# Patient Record
(demographics unavailable — no encounter records)

---

## 2024-10-17 NOTE — DISCUSSION/SUMMARY
[EKG obtained to assist in diagnosis and management of assessed problem(s)] : EKG obtained to assist in diagnosis and management of assessed problem(s) [FreeTextEntry1] : 83-year-old M with PMHx of CAD (2007 CABG x 4), Atrial flutter/fib (on Eliquis), HLD, HTN, chronic HFrEF, presents today for follow up.  1. HTN:  - improved (pt was dehydrated and hypotensive)  - continue with Entresto 97/103 and Carvedilol 25 mg oral, Jardiance 10mg daily  - repeat BMP and BNP today   2. CAD/HLD: No signs/sx of acute ischemia, LDL at goal in Feb at 30 mg/dL  - c/w Lipitor 80 mg oral daily  - c/w Zetia 10 mg oral daily   3. HFrEF: Mild pitting edema on today's exam TTE Aug 2023 w/ EF at 35% - continue with beta blocker, entresto and Jardiance as above - advised to continue to follow with heart failure team - checking labs as above  - will order repeat echo; performed today after his visit as an add on   4. Aflutter/fib: Asymptomatic, rate controlled but currently in AFib. No signs of GI/ bleed. - continue with Eliquis 5 mg BID daily  - patient requested to change EP to [place] as pt is currently being seen at [place]. Gave referral in person of possible EP providers (Dr. Phipps or Dr. Castillo)   Gave referrals for new PCP. Advised dermatology appt for nipple soreness.   One month f/u w/ NP for BP check. (Make sure pt has appt w/ EP provider at this visit).  Follow up with Dr Cazares in 6 months in person.

## 2024-10-17 NOTE — HISTORY OF PRESENT ILLNESS
[FreeTextEntry1] : 83-year-old M with PMHx of CAD (2007 CABG x 4), Atrial flutter/fib (on Eliquis), HLD, HTN, chronic HFrEF, presents today for follow up.   Since his last visit; pt presented to the ER for hypotension/dehydration. He reports increasing his water intake and that has helped, as well as w/ leg cramps. The only med that was being held was Jardiance. Will recheck labs today and likely restart it.   Patient denies any chest pain, SOB, palpitations, orthopnea, PND. He feels the LE edema has been well controlled. Wearing compression socks regularly.   Pt reports some L nipple soreness.   ================================= Prior history   Pt says that he is feeling much better since staring Jardiance 10 mg Qd, Aug 2023. He has been able to walk up to his 4th floor walk up apartment without any FORBES; no HF exacerbations/hospitalizations since the last time he visited. Medically compliant with all of his medications.   No chest pain, SOB, palpitations, nausea, vomiting, PND, or orthopnea reported.  =============================== Last visits:  Patient went to the [facility] Ed ([place]) since last visit due to dizziness on turning. In the ED he received a CT scan which showed no abnormalities. He was discharged on Meclizine which he thinks helped. Patient has not reported any falls. He was previously given a referral to follow Heart Failure team previously, but he never followed with them.  GDMT:  lipitor 80 mg Qd, carvedilol 25 mg BID, eliquis 5 mg Qd, entresto  BID, jardiance 10 mg Qd, spironolactone 25 mg Qd  ================================= Labs/Diagnostics: 2024 lipids  HDL 66 LDL 30 TG 89, a1c 5.7% Lipid profile (10/2023): TAG 85, , HDL 63, LDL 36/ BMP Creatinine 1.41, eGFR 50  A1C (10/2023): 5.8  Lipid profile (2022): T, TC: 157, HDL: 68, LDL: 73/ K/Creat 4.7/1.06/ A1c: 5.7%  - Echo (2023): mildly dilated LV cavity sz, left ventricular wall thickness is normal, left ventricular systolic fnx is moderately decreased, multiple segmental abnormalities exist, there is nl LV diastolic fnx w. nl filling pressures, nl rv cavity sz, nl RV systolic fnx, mild to moderate MR, mild TR, mild to moderate AR, no pericardial effusion  - Echo (2022): moderate segmental LV systolic dysfunction; the inferior, basal inferoseptal and inferolateral walls are akinetic; the anterolateral wall is hypokinetic; LVEF 35%; normal RV size and function; mild AR - Echo (2021)- EF 35%, inf, inferolateral, basal inferoseptal, akinesis, mid & apical anterolateral hypo, mild LVH, nl RV size and fx, no effusion

## 2024-10-17 NOTE — CARDIOLOGY SUMMARY
[de-identified] : ECG 02/2024: similar to prior ECG - occassional PVCs with anterior infarct; atrial fib

## 2024-10-17 NOTE — PHYSICAL EXAM
[Well Developed] : well developed [Well Nourished] : well nourished [No Acute Distress] : no acute distress [Normal Conjunctiva] : normal conjunctiva [Normal Venous Pressure] : normal venous pressure [No Carotid Bruit] : no carotid bruit [Clear Lung Fields] : clear lung fields [Good Air Entry] : good air entry [No Respiratory Distress] : no respiratory distress  [Normal Gait] : normal gait [No Varicosities] : no varicosities [No Rash] : no rash [Moves all extremities] : moves all extremities [No Focal Deficits] : no focal deficits [Normal Speech] : normal speech [Alert and Oriented] : alert and oriented [Normal memory] : normal memory [Normal] : soft, non-tender, no masses/organomegaly, normal bowel sounds [No Cyanosis] : no cyanosis [No Clubbing] : no clubbing [Normal PT B/L] : normal PT B/L [Normal DP B/L] : normal DP B/L [de-identified] : irregularly irregular rhythm  [de-identified] : 1+ pitting edema to R LE; 2+ pitting edema to L LE

## 2024-11-15 NOTE — DISCUSSION/SUMMARY
[FreeTextEntry1] : 83-year-old M with PMHx of CAD (2007 CABG x 4), Atrial flutter/fib (on Eliquis), HLD, HTN, chronic HFrEF, presents today for follow up.  1. HTN: reasonable today, but advised he continue to stay hydrated and check BP at home; alert us of any hypotension or dizziness as BP a bit soft today  - continue with Entresto 97/103 and Carvedilol 25 mg oral, Jardiance 10mg daily  - repeat BMP revealed K normalized to 5 and renal fx is stable   2. CAD/HLD: No signs/sx of acute ischemia, LDL at goal in Feb at 30 mg/dL  - c/w Lipitor 80 mg and Zetia 10 mg oral daily   3. HFrEF: euvolemic on exam  TTE 10/21/24:  Left ventricular cavity is mildly dilated. Left ventricular wall thickness is normal. Left ventricular systolic function is moderately decreased with an ejection fraction visually estimated at 30 to 35 %. There are no regional wall motion abnormalities seen. Mildly reduced right ventricular systolic function. Left atrium is mildly dilated. Moderate mitral regurgitation. Mild to moderate tricuspid regurgitation. Mild to moderate aortic regurgitation. Moderate non-mobile focal atheroma in the visualized portions of the transverse aortic arch. - continue with beta blocker, entresto and Jardiance as above - advised to continue to follow with heart failure team  4. Aflutter/fib: Asymptomatic, rate controlled - continue with Eliquis 5 mg BID daily  - patient requested to change EP to [place] as pt is currently being seen at [place]. Gave referral in person of possible EP providers (Dr. Phipps or Dr. Castillo and reached out to  to call pt)   Case discussed w/ Dr. Cazares.  Follow up with Dr Cazares in 6 months in person.

## 2024-11-15 NOTE — CARDIOLOGY SUMMARY
[de-identified] : ECG 02/2024: similar to prior ECG - occassional PVCs with anterior infarct; atrial fib

## 2024-11-15 NOTE — HISTORY OF PRESENT ILLNESS
[FreeTextEntry1] : 83-year-old M with PMHx of CAD (2007 CABG x 4), Atrial flutter/fib (on Eliquis), HLD, HTN, chronic HFrEF, presents today for follow up.   Since his last visit; pt reports feeling well. He has maintained his water intake and that has helped, as well as w/ leg cramps.  Patient denies any chest pain, SOB, palpitations, orthopnea, PND. He feels the LE edema has been well controlled. Wearing compression socks regularly.   Pt reports compliance w/ all his meds.   TTE 10/21/24:  Left ventricular cavity is mildly dilated. Left ventricular wall thickness is normal. Left ventricular systolic function is moderately decreased with an ejection fraction visually estimated at 30 to 35 %. There are no regional wall motion abnormalities seen. Mildly reduced right ventricular systolic function. Left atrium is mildly dilated. Moderate mitral regurgitation. Mild to moderate tricuspid regurgitation. Mild to moderate aortic regurgitation. Moderate non-mobile focal atheroma in the visualized portions of the transverse aortic arch.  ================================= Prior history   Pt says that he is feeling much better since staring Jardiance 10 mg Qd, Aug 2023. He has been able to walk up to his 4th floor walk up apartment without any FORBES; no HF exacerbations/hospitalizations since the last time he visited. Medically compliant with all of his medications.   No chest pain, SOB, palpitations, nausea, vomiting, PND, or orthopnea reported.  =============================== Last visits:  Patient went to the [facility] Ed ([facility]) since last visit due to dizziness on turning. In the ED he received a CT scan which showed no abnormalities. He was discharged on Meclizine which he thinks helped. Patient has not reported any falls. He was previously given a referral to follow Heart Failure team previously, but he never followed with them.  GDMT:  lipitor 80 mg Qd, carvedilol 25 mg BID, eliquis 5 mg Qd, entresto  BID, jardiance 10 mg Qd, spironolactone 25 mg Qd  ================================= Labs/Diagnostics: 2024 lipids  HDL 66 LDL 30 TG 89, a1c 5.7% Lipid profile (10/2023): TAG 85, , HDL 63, LDL 36/ BMP Creatinine 1.41, eGFR 50  A1C (10/2023): 5.8  Lipid profile (2022): T, TC: 157, HDL: 68, LDL: 73/ K/Creat 4.7/1.06/ A1c: 5.7%  - Echo (2023): mildly dilated LV cavity sz, left ventricular wall thickness is normal, left ventricular systolic fnx is moderately decreased, multiple segmental abnormalities exist, there is nl LV diastolic fnx w. nl filling pressures, nl rv cavity sz, nl RV systolic fnx, mild to moderate MR, mild TR, mild to moderate AR, no pericardial effusion  - Echo (2022): moderate segmental LV systolic dysfunction; the inferior, basal inferoseptal and inferolateral walls are akinetic; the anterolateral wall is hypokinetic; LVEF 35%; normal RV size and function; mild AR - Echo (2021)- EF 35%, inf, inferolateral, basal inferoseptal, akinesis, mid & apical anterolateral hypo, mild LVH, nl RV size and fx, no effusion

## 2024-11-15 NOTE — DISCUSSION/SUMMARY
[FreeTextEntry1] : 83-year-old M with PMHx of CAD (2007 CABG x 4), Atrial flutter/fib (on Eliquis), HLD, HTN, chronic HFrEF, presents today for follow up.  1. HTN: reasonable today, but advised he continue to stay hydrated and check BP at home; alert us of any hypotension or dizziness as BP a bit soft today  - continue with Entresto 97/103 and Carvedilol 25 mg oral, Jardiance 10mg daily  - repeat BMP revealed K normalized to 5 and renal fx is stable   2. CAD/HLD: No signs/sx of acute ischemia, LDL at goal in Feb at 30 mg/dL  - c/w Lipitor 80 mg and Zetia 10 mg oral daily   3. HFrEF: euvolemic on exam  TTE 10/21/24:  Left ventricular cavity is mildly dilated. Left ventricular wall thickness is normal. Left ventricular systolic function is moderately decreased with an ejection fraction visually estimated at 30 to 35 %. There are no regional wall motion abnormalities seen. Mildly reduced right ventricular systolic function. Left atrium is mildly dilated. Moderate mitral regurgitation. Mild to moderate tricuspid regurgitation. Mild to moderate aortic regurgitation. Moderate non-mobile focal atheroma in the visualized portions of the transverse aortic arch. - continue with beta blocker, entresto and Jardiance as above - advised to continue to follow with heart failure team  4. Aflutter/fib: Asymptomatic, rate controlled - continue with Eliquis 5 mg BID daily  - patient requested to change EP to [place] as pt is currently being seen at [place]. Gave referral in person of possible EP providers (Dr. Phipps or Dr. Castillo and reached out to  to call pt)   Case discussed w/ Dr. Cazares.  Follow up with Dr Cazares in 6 months in person.

## 2024-11-15 NOTE — HISTORY OF PRESENT ILLNESS
[FreeTextEntry1] : 83-year-old M with PMHx of CAD (2007 CABG x 4), Atrial flutter/fib (on Eliquis), HLD, HTN, chronic HFrEF, presents today for follow up.   Since his last visit; pt reports feeling well. He has maintained his water intake and that has helped, as well as w/ leg cramps.  Patient denies any chest pain, SOB, palpitations, orthopnea, PND. He feels the LE edema has been well controlled. Wearing compression socks regularly.   Pt reports compliance w/ all his meds.   TTE 10/21/24:  Left ventricular cavity is mildly dilated. Left ventricular wall thickness is normal. Left ventricular systolic function is moderately decreased with an ejection fraction visually estimated at 30 to 35 %. There are no regional wall motion abnormalities seen. Mildly reduced right ventricular systolic function. Left atrium is mildly dilated. Moderate mitral regurgitation. Mild to moderate tricuspid regurgitation. Mild to moderate aortic regurgitation. Moderate non-mobile focal atheroma in the visualized portions of the transverse aortic arch.  ================================= Prior history   Pt says that he is feeling much better since staring Jardiance 10 mg Qd, Aug 2023. He has been able to walk up to his 4th floor walk up apartment without any FORBES; no HF exacerbations/hospitalizations since the last time he visited. Medically compliant with all of his medications.   No chest pain, SOB, palpitations, nausea, vomiting, PND, or orthopnea reported.  =============================== Last visits:  Patient went to the [facility] Ed ([facility]) since last visit due to dizziness on turning. In the ED he received a CT scan which showed no abnormalities. He was discharged on Meclizine which he thinks helped. Patient has not reported any falls. He was previously given a referral to follow Heart Failure team previously, but he never followed with them.  GDMT:  lipitor 80 mg Qd, carvedilol 25 mg BID, eliquis 5 mg Qd, entresto  BID, jardiance 10 mg Qd, spironolactone 25 mg Qd  ================================= Labs/Diagnostics: 2024 lipids  HDL 66 LDL 30 TG 89, a1c 5.7% Lipid profile (10/2023): TAG 85, , HDL 63, LDL 36/ BMP Creatinine 1.41, eGFR 50  A1C (10/2023): 5.8  Lipid profile (2022): T, TC: 157, HDL: 68, LDL: 73/ K/Creat 4.7/1.06/ A1c: 5.7%  - Echo (2023): mildly dilated LV cavity sz, left ventricular wall thickness is normal, left ventricular systolic fnx is moderately decreased, multiple segmental abnormalities exist, there is nl LV diastolic fnx w. nl filling pressures, nl rv cavity sz, nl RV systolic fnx, mild to moderate MR, mild TR, mild to moderate AR, no pericardial effusion  - Echo (2022): moderate segmental LV systolic dysfunction; the inferior, basal inferoseptal and inferolateral walls are akinetic; the anterolateral wall is hypokinetic; LVEF 35%; normal RV size and function; mild AR - Echo (2021)- EF 35%, inf, inferolateral, basal inferoseptal, akinesis, mid & apical anterolateral hypo, mild LVH, nl RV size and fx, no effusion

## 2024-11-15 NOTE — CARDIOLOGY SUMMARY
[de-identified] : ECG 02/2024: similar to prior ECG - occassional PVCs with anterior infarct; atrial fib

## 2024-11-15 NOTE — PHYSICAL EXAM
[Well Developed] : well developed [Well Nourished] : well nourished [No Acute Distress] : no acute distress [Normal Conjunctiva] : normal conjunctiva [Normal Venous Pressure] : normal venous pressure [No Carotid Bruit] : no carotid bruit [Clear Lung Fields] : clear lung fields [Good Air Entry] : good air entry [No Respiratory Distress] : no respiratory distress  [Normal Gait] : normal gait [Normal PT B/L] : normal PT B/L [Normal DP B/L] : normal DP B/L [No Rash] : no rash [Moves all extremities] : moves all extremities [No Focal Deficits] : no focal deficits [Normal Speech] : normal speech [Alert and Oriented] : alert and oriented [Normal memory] : normal memory [Normal S1, S2] : normal S1, S2 [No Edema] : no edema

## 2024-12-12 NOTE — PHYSICAL EXAM
Pharmacy request   [Well Developed] : well developed [Well Nourished] : well nourished [No Acute Distress] : no acute distress [Normal Conjunctiva] : normal conjunctiva [Normal Venous Pressure] : normal venous pressure [No Carotid Bruit] : no carotid bruit [Clear Lung Fields] : clear lung fields [Good Air Entry] : good air entry [No Respiratory Distress] : no respiratory distress  [Normal Gait] : normal gait [Normal PT B/L] : normal PT B/L [Normal DP B/L] : normal DP B/L [No Rash] : no rash [Moves all extremities] : moves all extremities [No Focal Deficits] : no focal deficits [Normal Speech] : normal speech [Alert and Oriented] : alert and oriented [Normal memory] : normal memory [Normal S1, S2] : normal S1, S2 [No Edema] : no edema

## 2025-01-17 NOTE — PHYSICAL EXAM
[Well Developed] : well developed [Well Nourished] : well nourished [No Acute Distress] : no acute distress [Normal Conjunctiva] : normal conjunctiva [Normal Venous Pressure] : normal venous pressure [No Carotid Bruit] : no carotid bruit [Normal S1, S2] : normal S1, S2 [No Murmur] : no murmur [No Rub] : no rub [No Gallop] : no gallop [5th Left ICS - MCL] : palpated at the 5th LICS in the midclavicular line [Normal] : normal [Irregularly Irregular] : irregularly irregular [Clear Lung Fields] : clear lung fields [Good Air Entry] : good air entry [No Respiratory Distress] : no respiratory distress  [Soft] : abdomen soft [Non Tender] : non-tender [No Masses/organomegaly] : no masses/organomegaly [Normal Bowel Sounds] : normal bowel sounds [Normal Gait] : normal gait [No Edema] : no edema [No Cyanosis] : no cyanosis [No Clubbing] : no clubbing [No Varicosities] : no varicosities [No Rash] : no rash [No Skin Lesions] : no skin lesions [Moves all extremities] : moves all extremities [No Focal Deficits] : no focal deficits [Normal Speech] : normal speech [Alert and Oriented] : alert and oriented [Normal memory] : normal memory

## 2025-01-21 NOTE — HISTORY OF PRESENT ILLNESS
[FreeTextEntry1] : 83-year-old M with PMHx of CAD (2007 CABG x 4), HLD, HTN, chronic HFrEF (EF 30-35%) and persistent atrial fibrillation who presents to establish EP care.    He reports prior EP care with Dr. Lehman at [facility].  Reports h/o DCCV (2019) and ablation 2/2020.  He was told that he might need a pacemaker at that time.  He generally feels "ok" and denies CP, dizziness, presyncope/syncope.    He denies history of presyncope/syncope.    Most recent LTM 5/2021 was without atrial fibrillation.  Most recent sinus ECG is 8/3/21.  Review of all other ECGs available since then demonstrate atrial fibrillation with CVR.    He is helping take care of a friend who is having health issues.

## 2025-01-21 NOTE — ADDENDUM
[FreeTextEntry1] : I, Narcisa Davis, am scribing for and the presence of Dr. Castillo the following sections: HPI, PMH,Family/social history, ROS, Physical Exam, Assessment / Plan. I, Ramiro Castillo, personally performed the services described in the documentation, reviewed the documentation recorded by the scribe in my presence and it accurately and completely records my words and actions.

## 2025-01-21 NOTE — HISTORY OF PRESENT ILLNESS
[FreeTextEntry1] : 83-year-old M with PMHx of CAD (2007 CABG x 4), HLD, HTN, chronic HFrEF (EF 30-35%) and persistent atrial fibrillation who presents to establish EP care.    He reports prior EP care with Dr. Lehman at [facility].  Reports h/o DCCV (2019) and ablation 2/2020.  He was told that he might need a pacemaker at that time.  He generally feels "ok" and denies CP, dizziness, presyncope/syncope.    He denies history of presyncope/syncope.    Most recent LTM 5/2021 was without atrial fibrillation.  Most recent sinus ECG is 8/3/21.  Review of all other ECGs available since then demonstrate atrial fibrillation with CVR.    He is helping take care of a friend who is having health issues.

## 2025-01-21 NOTE — CARDIOLOGY SUMMARY
[de-identified] : 1/14/25 AF @ 69 bpm  [de-identified] : TTE 10/21/24: Left ventricular cavity is mildly dilated. Left ventricular wall thickness is normal. Left ventricular systolic function is moderately decreased with an ejection fraction visually estimated at 30 to 35 %. There are no regional wall motion abnormalities seen. Mildly reduced right ventricular systolic function. Left atrium is mildly dilated. Moderate mitral regurgitation. Mild to moderate tricuspid regurgitation. Mild to moderate aortic regurgitation. Moderate non-mobile focal atheroma in the visualized portions of the transverse aortic arch.

## 2025-01-21 NOTE — DISCUSSION/SUMMARY
[FreeTextEntry1] : 83-year-old M with PMHx of CAD (2007 CABG x 4), HLD, HTN, chronic HFrEF and persistent atrial fibrillation who presents for initial evaluation   1.  Atrial fibrillation Persistent AF based upon review of available ECGs since 2021.  Given HFrEF, recommend restoration of sinus rhythm.  We discussed in detail the normal conduction system of the heart and what atrial fibrillation is.  We discussed the natural progression of this arrhythmia in the context of any existing comorbidities.  We also discussed the association between atrial fibrillation and thrombotic events / stroke.     We discussed treatment options for atrial fibrillation including rate control vs. rhythm control with antiarrhythmic medications or an ablation.  The patient was counseled on the fact that there is no cure for atrial fibrillation and that for long term control of atrial fibrillation a combination of strategies is often used.  Regardless of treatment options and maintenance of sinus rhythm, anticoagulation is still recommended based on CHADSVASC score.     Success in rhythm control management is best defined as improved quality of life, maintenance of sinus rhythm and reduced hospitalization since not all patients have continuous monitoring and .or symptoms to diagnose sub-clinical episodes.  Modern day ablation likely results in better long term outcomes compared to medical therapy alone, but repeat procedures and/or addition of medications may be required.  Results of ablation are better for paroxysmal patients compared to persistent patients, which are better than long-standing persistent patients.  Typical 3-5 year success rates (freedom from clinical atrial fibrillation) based on available literature over multiple procedures were quoted to the patient at approximately 80-90% for paroxysmal, 60-80% for persistent and 40-60% for long standing persistent.      After discussing this, the patient would like to proceed with an ablation.  We discussed the benefits and drawbacks of each option in detail.   We discussed the procedure in detail including risks, benefits, and alternatives.  I have quoted a 1:700 chance of major complication related to the procedure.  Risks including, but not limited to; infection, anesthesia reaction, bleeding, pain, vascular injury, cardiac perforation, esophageal injury/fistula,  TE/CVA, phrenic nerve injury, arrhythmia recurrence and death were discussed.  We also discussed that having recurrent arrhythmia for the first 90 days post ablation is not predictive of long term success of the ablation.   2.  Stroke Risk CHADS VASc at least 3 Continue Eliquis for TE/CVA ppx   All questions answered and the patient was given pre procedure instructions.  Advised to call with any questions or concerns.

## 2025-01-21 NOTE — CARDIOLOGY SUMMARY
[de-identified] : 1/14/25 AF @ 69 bpm  [de-identified] : TTE 10/21/24: Left ventricular cavity is mildly dilated. Left ventricular wall thickness is normal. Left ventricular systolic function is moderately decreased with an ejection fraction visually estimated at 30 to 35 %. There are no regional wall motion abnormalities seen. Mildly reduced right ventricular systolic function. Left atrium is mildly dilated. Moderate mitral regurgitation. Mild to moderate tricuspid regurgitation. Mild to moderate aortic regurgitation. Moderate non-mobile focal atheroma in the visualized portions of the transverse aortic arch.

## 2025-01-21 NOTE — CARDIOLOGY SUMMARY
[de-identified] : 1/14/25 AF @ 69 bpm  [de-identified] : TTE 10/21/24: Left ventricular cavity is mildly dilated. Left ventricular wall thickness is normal. Left ventricular systolic function is moderately decreased with an ejection fraction visually estimated at 30 to 35 %. There are no regional wall motion abnormalities seen. Mildly reduced right ventricular systolic function. Left atrium is mildly dilated. Moderate mitral regurgitation. Mild to moderate tricuspid regurgitation. Mild to moderate aortic regurgitation. Moderate non-mobile focal atheroma in the visualized portions of the transverse aortic arch.

## 2025-01-21 NOTE — HISTORY OF PRESENT ILLNESS
[FreeTextEntry1] : 83-year-old M with PMHx of CAD (2007 CABG x 4), HLD, HTN, chronic HFrEF (EF 30-35%) and persistent atrial fibrillation who presents to establish EP care.    He reports prior EP care with Dr. Lehman at [facility].  Reports h/o DCCV (2019) and ablation 2/2020.  He was told that he might need a pacemaker at that time.  He generally feels "ok" and denies CP, dizziness, presyncope/syncope.    He denies history of presyncope/syncope.    Most recent LTM 5/2021 was without atrial fibrillation.  Most recent sinus ECG is 8/3/21.  Review of all other ECGs available since then demonstrate atrial fibrillation with CVR.    He is helping take care of a friend who is having health issues.

## 2025-03-27 NOTE — DISCUSSION/SUMMARY
[de-identified] : We reviewed the available imaging the office today as well as my impression of his history and physical examination.  Reviewed adult spinal deformity natural history as well as all available treatment options.  Presently we have agreed upon a recommendation for a course of physical therapy.  He will continue take Tylenol as needed occasionally for the low back pain.  We briefly discussed surgical options which I would not recommend approximately.  He will keep us informed of his progress  I have spent greater than 60 minutes preparing to see the patient, collecting relevant history, performing a thorough history and physical examination, counseling the patient regarding my findings ordering the appropriate therapies and tests, communicating with other relevant healthcare professionals, documenting my encounter and coordinating care.
96

## 2025-03-27 NOTE — HISTORY OF PRESENT ILLNESS
[de-identified] : 83-year-old male presents as new patient for evaluation of postural abnormality and low back pain.,  Last several years he describes it gradual sensation that he is somewhat hunched forward.  He is still able to function reasonably well.  Occasionally sits he notices that a prominence in the mid back can become uncomfortable to press against the chair.  Also has an occasional aching low back pain diffusely depending on his activity level.  Denies any radiating pain numbness tingling weakness in extremities

## 2025-03-27 NOTE — PHYSICAL EXAM
[UE/LE] : Sensory: Intact in bilateral upper & lower extremities [Normal DTR Reflexes] : DTR reflexes normal [Normal Proprioception] : sensation intact for proprioception [Normal] : Oriented to person, place, and time, insight and judgement were intact and the affect was normal [de-identified] : AP lateral scoliosis x-ray 3/27/2025 PACS No significant coronal abnormality.  Sweeping kyphosis with apex near the thoracolumbar junction with pelvic retroversion

## 2025-04-09 NOTE — CARDIOLOGY SUMMARY
[de-identified] : 1/14/25 AF @ 69 bpm  4/1/2025 Junctional rhythm at 46 bpm  [de-identified] : TTE 10/21/24: Left ventricular cavity is mildly dilated. Left ventricular wall thickness is normal. Left ventricular systolic function is moderately decreased with an ejection fraction visually estimated at 30 to 35 %. There are no regional wall motion abnormalities seen. Mildly reduced right ventricular systolic function. Left atrium is mildly dilated. Moderate mitral regurgitation. Mild to moderate tricuspid regurgitation. Mild to moderate aortic regurgitation. Moderate non-mobile focal atheroma in the visualized portions of the transverse aortic arch.

## 2025-04-09 NOTE — ADDENDUM
1800 Hourly rounding completed. Patient's needs addressed and updated on plan of care.    [FreeTextEntry1] : I, Aaron Peterson, am scribing for and the presence of Dr. Castillo the following sections: HPI, PMH,Family/social history, ROS, Physical Exam, Assessment / Plan.  I, Ramiro Castillo, personally performed the services described in the documentation, reviewed the documentation recorded by the scribe in my presence and it accurately and completely records my words and actions.

## 2025-04-09 NOTE — DISCUSSION/SUMMARY
[FreeTextEntry1] : 83-year-old M with PMHx of CAD (2007 CABG x 4), HLD, HTN, chronic HFrEF and persistent atrial fibrillation on 2/24/2025. Long term monitoring showed atrial fibrillation.   1.  Atrial fibrillation Persistent AF based upon review of available ECGs since 2021.  He underwent PFA ablation on 2/24/2025 ECG today shows junctional rhythm at 46bpm.  Repeat TTE post AF ablation and RTO in 3 weeks  If his EF remains low, we will offer him ICD   2.  Stroke Risk CHADS VASc at least 3 Continue Eliquis for TE/CVA ppx   Advised to call with any questions or concerns. [EKG obtained to assist in diagnosis and management of assessed problem(s)] : EKG obtained to assist in diagnosis and management of assessed problem(s)

## 2025-04-09 NOTE — HISTORY OF PRESENT ILLNESS
[FreeTextEntry1] : Mr. Barragan is a 83-year-old M with PMHx of CAD (2007 CABG x 4), HLD, HTN, chronic HFrEF (EF 30-35%) and persistent atrial fibrillation who underwent PFA of PVI, PWI, Anterior Mitral line and CTI on 2/24/2025.   He is doing well post ablation. He does not complain of palpitations, groin discomfort, chest discomfort, dizziness, syncope, sob/montelongo.  He remains on Carvedilol 25mg PO BID and Eliquis 5mg PO BID.    He wore a monitor on 2/28/2025 to 3/14/2025 which showed atrial fibrillation; his burden is under-reported due to misinterpretation. SR 54, 67, 163 bpm.    Remote history:  He reports prior EP care with Dr. Lehman at [facility].  Reports h/o DCCV (2019) and ablation 2/2020.  He was told that he might need a pacemaker at that time.  He generally feels "ok" and denies CP, dizziness, presyncope/syncope.    Most recent LTM 5/2021 was without atrial fibrillation.  Most recent sinus ECG is 8/3/21.  Review of all other ECGs available since then demonstrate atrial fibrillation with CVR.

## 2025-04-09 NOTE — CARDIOLOGY SUMMARY
[de-identified] : 1/14/25 AF @ 69 bpm  4/1/2025 Junctional rhythm at 46 bpm  [de-identified] : TTE 10/21/24: Left ventricular cavity is mildly dilated. Left ventricular wall thickness is normal. Left ventricular systolic function is moderately decreased with an ejection fraction visually estimated at 30 to 35 %. There are no regional wall motion abnormalities seen. Mildly reduced right ventricular systolic function. Left atrium is mildly dilated. Moderate mitral regurgitation. Mild to moderate tricuspid regurgitation. Mild to moderate aortic regurgitation. Moderate non-mobile focal atheroma in the visualized portions of the transverse aortic arch.

## 2025-04-09 NOTE — ADDENDUM
[FreeTextEntry1] : I, Aaron Peterson, am scribing for and the presence of Dr. Castillo the following sections: HPI, PMH,Family/social history, ROS, Physical Exam, Assessment / Plan.  I, Ramiro Castillo, personally performed the services described in the documentation, reviewed the documentation recorded by the scribe in my presence and it accurately and completely records my words and actions.

## 2025-04-09 NOTE — HISTORY OF PRESENT ILLNESS
[FreeTextEntry1] : Mr. Barragan is a 83-year-old M with PMHx of CAD (2007 CABG x 4), HLD, HTN, chronic HFrEF (EF 30-35%) and persistent atrial fibrillation who underwent PFA of PVI, PWI, Anterior Mitral line and CTI on 2/24/2025.   He is doing well post ablation. He does not complain of palpitations, groin discomfort, chest discomfort, dizziness, syncope, sob/montelongo.  He remains on Carvedilol 25mg PO BID and Eliquis 5mg PO BID.    He wore a monitor on 2/28/2025 to 3/14/2025 which showed atrial fibrillation; his burden is under-reported due to misinterpretation. SR 54, 67, 163 bpm.    Remote history:  He reports prior EP care with Dr. Lehman at [facility].  Reports h/o DCCV (2019) and ablation 2/2020.  He was told that he might need a pacemaker at that time.  He generally feels "ok" and denies CP, dizziness, presyncope/syncope.    Most recent LTM 5/2021 was without atrial fibrillation.  Most recent sinus ECG is 8/3/21.  Review of all other ECGs available since then demonstrate atrial fibrillation with CVR.

## 2025-04-17 NOTE — PHYSICAL EXAM
[Well Developed] : well developed [Well Nourished] : well nourished [No Acute Distress] : no acute distress [Normal Conjunctiva] : normal conjunctiva [Normal Venous Pressure] : normal venous pressure [No Carotid Bruit] : no carotid bruit [Normal S1, S2] : normal S1, S2 [Clear Lung Fields] : clear lung fields [Good Air Entry] : good air entry [No Respiratory Distress] : no respiratory distress  [Normal Gait] : normal gait [No Edema] : no edema [Normal PT B/L] : normal PT B/L [Normal DP B/L] : normal DP B/L [No Rash] : no rash [Moves all extremities] : moves all extremities [No Focal Deficits] : no focal deficits [Normal Speech] : normal speech [Alert and Oriented] : alert and oriented [Normal memory] : normal memory

## 2025-04-18 NOTE — CARDIOLOGY SUMMARY
[de-identified] : ECG 02/2024: similar to prior ECG - occassional PVCs with anterior infarct; atrial fib

## 2025-04-18 NOTE — CARDIOLOGY SUMMARY
[de-identified] : ECG 02/2024: similar to prior ECG - occassional PVCs with anterior infarct; atrial fib

## 2025-04-18 NOTE — DISCUSSION/SUMMARY
[FreeTextEntry1] : 83-year-old M with PMHx of CAD (2007 CABG x 4), Atrial flutter/fib (on Eliquis), HLD, HTN, HFrEF (EF 30-35%), presents today for follow up.   CAD/HLD LDL goal at least <70 - c/w Lipitor 80 mg and Zetia 10 mg oral daily   HFrEF: euvolemic on exam  Oct 2024 TTE - EF 30-35%, mildly reduced RV systolic fn, moderate MR, mild to mod TR, mild to mod AI. Moderate non-mobile focal atheroma in the visualized portions of the transverse aortic arch. - continue with Entresto 97/103 and Carvedilol 25 mg oral, Jardiance 10mg daily, spironolactone 25mg daily - BMP today - advised to continue to follow with heart failure team  Aflutter/fib s/p ablation 2/24/25 - continue with Eliquis 5 mg BID daily  - follows with EP Dr. Castillo - planned for repeat TTE4/18 and ablation with Dr. Castillo.  - consideration for ICD depending on results. In past, he has preferred to be conservative, but will consider.  HTN: well controlled.  Follow up with Dr Cazares in 6 months in person.

## 2025-04-18 NOTE — HISTORY OF PRESENT ILLNESS
[FreeTextEntry1] : 83-year-old M with PMHx of CAD (2007 CABG x 4), Atrial flutter/fib (on Eliquis), HLD, HTN, HFrEF (EF 30-35%), presents today for follow up.   He has lost weight since last visit and endorses that he likely has not been eating as much in an effort to be more healthy.  He is awaiting an Afib ablation and consideration for defibrillator based on repeat echo tomorrow.   Patient denies any chest pain, SOB, palpitations, orthopnea, PND. Still with intermittent mild edema.  Pt reports adherence w/ all his meds.  ================================= Labs/Diagnostics: 2024 lipids  HDL 66 LDL 30 TG 89, a1c 5.7% Lipid profile (10/2023): TAG 85, , HDL 63, LDL 36/ BMP Creatinine 1.41, eGFR 50  A1C (10/2023): 5.8  Lipid profile (2022): T, TC: 157, HDL: 68, LDL: 73/ K/Creat 4.7/1.06/ A1c: 5.7%  Studies TTE 10/21/24:  Left ventricular cavity is mildly dilated. Left ventricular wall thickness is normal. Left ventricular systolic function is moderately decreased with an ejection fraction visually estimated at 30 to 35 %. There are no regional wall motion abnormalities seen. Mildly reduced right ventricular systolic function. Left atrium is mildly dilated. Moderate mitral regurgitation. Mild to moderate tricuspid regurgitation. Mild to moderate aortic regurgitation. Moderate non-mobile focal atheroma in the visualized portions of the transverse aortic arch. - Echo (2023): mildly dilated LV cavity sz, left ventricular wall thickness is normal, left ventricular systolic fnx is moderately decreased, multiple segmental abnormalities exist, there is nl LV diastolic fnx w. nl filling pressures, nl rv cavity sz, nl RV systolic fnx, mild to moderate MR, mild TR, mild to moderate AR, no pericardial effusion  - Echo (2022): moderate segmental LV systolic dysfunction; the inferior, basal inferoseptal and inferolateral walls are akinetic; the anterolateral wall is hypokinetic; LVEF 35%; normal RV size and function; mild AR - Echo (2021)- EF 35%, inf, inferolateral, basal inferoseptal, akinesis, mid & apical anterolateral hypo, mild LVH, nl RV size and fx, no effusion

## 2025-04-29 NOTE — HISTORY OF PRESENT ILLNESS
[FreeTextEntry1] : Mr. Barragan is a 83-year-old M with PMHx of CAD (2007 CABG x 4), HLD, HTN, chronic HFrEF (EF 30-35%) and persistent atrial fibrillation who underwent PFA of PVI, PWI, Anterior Mitral line and CTI on 2/24/2025.   He is doing well post ablation. He does not complain of palpitations, groin discomfort, chest discomfort, dizziness, syncope, sob/montelongo.  Junctional rhythm on ECG noted on our last visit. New TTE obtained with EF of 38% (unchanged). Patient remains on GDMT (Entresto 97/103 and Carvedilol 25 mg oral, Jardiance 10mg daily, spironolactone 25mg daily).  Patient is here to discuss DC ICD implant.    He wore a cardiac monitor on 2/28/2025 to 3/14/2025 which showed paroxysmal atrial fibrillation. AF burden was under-reported due to misinterpretation. SR 54, 67, 163 bpm.   Remote history:  He reports prior EP care with Dr. Lehman at [facility].  Reports h/o DCCV (2019) and ablation 2/2020.  He was told that he might need a pacemaker at that time.  He generally feels "ok" and denies CP, dizziness, presyncope/syncope.    Most recent LTM 5/2021 was without atrial fibrillation.  Most recent sinus ECG is 8/3/21.  Review of all other ECGs available since then demonstrate atrial fibrillation with CVR.    TTE: 4/18/2025  1. Left ventricular systolic function is moderately decreased with an ejection fraction of 38 % by Plascencia's method of disks. Regional wall motion abnormalities present.  2. Basal and mid inferior wall, basal and mid inferolateral wall, and basal inferoseptal segment are akinetic.  3. Normal right ventricular cavity size, with normal wall thickness, and normal right ventricular systolic function.  4. Normal left and right atrial size.  5. Fibrocalcific aortic valve sclerosis without stenosis.  6. Mild aortic regurgitation.  7. Mild mitral regurgitation.  8. Mild tricuspid regurgitation.  9. No echocardiographic evidence of pulmonary hypertension. 10. No pericardial effusion seen. 11. No prior echocardiogram is available for comparison.

## 2025-04-29 NOTE — CARDIOLOGY SUMMARY
[de-identified] : 1/14/25 AF @ 69 bpm  4/1/2025 Junctional rhythm at 46 bpm 4/22/2025  Junctional rhythm at 66bpm QRS 108ms [de-identified] : TTE 10/21/24: Left ventricular cavity is mildly dilated. Left ventricular wall thickness is normal. Left ventricular systolic function is moderately decreased with an ejection fraction visually estimated at 30 to 35 %. There are no regional wall motion abnormalities seen. Mildly reduced right ventricular systolic function. Left atrium is mildly dilated. Moderate mitral regurgitation. Mild to moderate tricuspid regurgitation. Mild to moderate aortic regurgitation. Moderate non-mobile focal atheroma in the visualized portions of the transverse aortic arch.

## 2025-04-29 NOTE — CARDIOLOGY SUMMARY
[de-identified] : 1/14/25 AF @ 69 bpm  4/1/2025 Junctional rhythm at 46 bpm 4/22/2025  Junctional rhythm at 66bpm QRS 108ms [de-identified] : TTE 10/21/24: Left ventricular cavity is mildly dilated. Left ventricular wall thickness is normal. Left ventricular systolic function is moderately decreased with an ejection fraction visually estimated at 30 to 35 %. There are no regional wall motion abnormalities seen. Mildly reduced right ventricular systolic function. Left atrium is mildly dilated. Moderate mitral regurgitation. Mild to moderate tricuspid regurgitation. Mild to moderate aortic regurgitation. Moderate non-mobile focal atheroma in the visualized portions of the transverse aortic arch.

## 2025-04-29 NOTE — ASSESSMENT
[FreeTextEntry1] :  We discussed the pathophysiology of the patients non ischemic cardiomyopathy and the association with sudden cardiac death and ventricular arrhythmias. The patient is compliant with optimal heart failure medications with no improvement in his EF. We discussed what a DC ICD is and the potential long-term benefits in symptoms and survival.  We discussed that for every six ICDs placed one persons life is saved over a five year period.   A thorough discussion was had with the patient and his family concerning all aspects of ICD therapy. We reviewed the data supporting ICD therapy and how it applies individually. We discussed the procedures, risks and outcomes of ICD implantation and living with an ICD. We discussed management of ICD therapy throughout life, including deactivation of the ICD. After all questions were answered, it was a shared decision to proceed with ICD therapy.  We discussed the procedure in detail including risks, benefits, and alternatives. Risks and benefits of procedure have been discussed with patient including but not limited to: bleeding/pocket hematoma, phlebitis/thrombophlebitis, lead dislodgment, PPM and/or lead infection, device malfunction, implantation site discomfort, pneumothorax, hemothorax, myocardial perforation, anaphylaxis, air embolism, infection, skin erosion, lead fracture, lead dislodgement, pectoral muscle stimulation, intercostal or diaphragmatic pacing, vascular thrombosis, endocarditis, inappropriate shocks which may cause pain or psychological trauma. Risks and benefits of the possible use of medication for conscious/deep sedation have been explained to the patient. Informed consent obtained. Pre procedure instructions given to the patient and advised to call with any questions or concerns.

## 2025-04-29 NOTE — DISCUSSION/SUMMARY
[EKG obtained to assist in diagnosis and management of assessed problem(s)] : EKG obtained to assist in diagnosis and management of assessed problem(s) [FreeTextEntry1] : 83-year-old M with PMHx of CAD (2007 CABG x 4), HLD, HTN, chronic HFrEF and persistent atrial fibrillation on 2/24/2025.   1.  Atrial fibrillation Persistent AF based upon review of available ECGs since 2021.  He underwent PFA ablation on 2/24/2025 ECG continues to reveal junctional rhythm. We discussed DC ICD while his EF remains low despite GDMT.  2.  Stroke Risk CHADS VASc at least 3 Continue Eliquis for TE/CVA ppx   Advised to call with any questions or concerns.

## 2025-04-29 NOTE — HISTORY OF PRESENT ILLNESS
[FreeTextEntry1] : Mr. Barragan is a 83-year-old M with PMHx of CAD (2007 CABG x 4), HLD, HTN, chronic HFrEF (EF 30-35%) and persistent atrial fibrillation who underwent PFA of PVI, PWI, Anterior Mitral line and CTI on 2/24/2025.   He is doing well post ablation. He does not complain of palpitations, groin discomfort, chest discomfort, dizziness, syncope, sob/montelongo.  Junctional rhythm on ECG noted on our last visit. New TTE obtained with EF of 38% (unchanged). Patient remains on GDMT (Entresto 97/103 and Carvedilol 25 mg oral, Jardiance 10mg daily, spironolactone 25mg daily).  Patient is here to discuss DC ICD implant.    He wore a cardiac monitor on 2/28/2025 to 3/14/2025 which showed paroxysmal atrial fibrillation. AF burden was under-reported due to misinterpretation. SR 54, 67, 163 bpm.   Remote history:  He reports prior EP care with Dr. Lehman at [facility].  Reports h/o DCCV (2019) and ablation 2/2020.  He was told that he might need a pacemaker at that time.  He generally feels "ok" and denies CP, dizziness, presyncope/syncope.    Most recent LTM 5/2021 was without atrial fibrillation.  Most recent sinus ECG is 8/3/21.  Review of all other ECGs available since then demonstrate atrial fibrillation with CVR.    TTE: 4/18/2025  1. Left ventricular systolic function is moderately decreased with an ejection fraction of 38 % by Plascencia's method of disks. Regional wall motion abnormalities present.  2. Basal and mid inferior wall, basal and mid inferolateral wall, and basal inferoseptal segment are akinetic.  3. Normal right ventricular cavity size, with normal wall thickness, and normal right ventricular systolic function.  4. Normal left and right atrial size.  5. Fibrocalcific aortic valve sclerosis without stenosis.  6. Mild aortic regurgitation.  7. Mild mitral regurgitation.  8. Mild tricuspid regurgitation.  9. No echocardiographic evidence of pulmonary hypertension. 10. No pericardial effusion seen. 11. No prior echocardiogram is available for comparison.

## 2025-04-29 NOTE — CARDIOLOGY SUMMARY
[de-identified] : 1/14/25 AF @ 69 bpm  4/1/2025 Junctional rhythm at 46 bpm 4/22/2025  Junctional rhythm at 66bpm QRS 108ms [de-identified] : TTE 10/21/24: Left ventricular cavity is mildly dilated. Left ventricular wall thickness is normal. Left ventricular systolic function is moderately decreased with an ejection fraction visually estimated at 30 to 35 %. There are no regional wall motion abnormalities seen. Mildly reduced right ventricular systolic function. Left atrium is mildly dilated. Moderate mitral regurgitation. Mild to moderate tricuspid regurgitation. Mild to moderate aortic regurgitation. Moderate non-mobile focal atheroma in the visualized portions of the transverse aortic arch.

## 2025-04-29 NOTE — HISTORY OF PRESENT ILLNESS
[FreeTextEntry1] : Mr. Barragan is a 83-year-old M with PMHx of CAD (2007 CABG x 4), HLD, HTN, chronic HFrEF (EF 30-35%) and persistent atrial fibrillation who underwent PFA of PVI, PWI, Anterior Mitral line and CTI on 2/24/2025.   He is doing well post ablation. He does not complain of palpitations, groin discomfort, chest discomfort, dizziness, syncope, sob/montelongo.  Junctional rhythm on ECG noted on our last visit. New TTE obtained with EF of 38% (unchanged). Patient remains on GDMT (Entresto 97/103 and Carvedilol 25 mg oral, Jardiance 10mg daily, spironolactone 25mg daily).  Patient is here to discuss DC ICD implant.    He wore a cardiac monitor on 2/28/2025 to 3/14/2025 which showed paroxysmal atrial fibrillation. AF burden was under-reported due to misinterpretation. SR 54, 67, 163 bpm.   Remote history:  He reports prior EP care with Dr. Lehman at [facility].  Reports h/o DCCV (2019) and ablation 2/2020.  He was told that he might need a pacemaker at that time.  He generally feels "ok" and denies CP, dizziness, presyncope/syncope.    Most recent LTM 5/2021 was without atrial fibrillation.  Most recent sinus ECG is 8/3/21.  Review of all other ECGs available since then demonstrate atrial fibrillation with CVR.    TTE: 4/18/2025  1. Left ventricular systolic function is moderately decreased with an ejection fraction of 38 % by Plascencia's method of disks. Regional wall motion abnormalities present.  2. Basal and mid inferior wall, basal and mid inferolateral wall, and basal inferoseptal segment are akinetic.  3. Normal right ventricular cavity size, with normal wall thickness, and normal right ventricular systolic function.  4. Normal left and right atrial size.  5. Fibrocalcific aortic valve sclerosis without stenosis.  6. Mild aortic regurgitation.  7. Mild mitral regurgitation.  8. Mild tricuspid regurgitation.  9. No echocardiographic evidence of pulmonary hypertension. 10. No pericardial effusion seen. 11. No prior echocardiogram is available for comparison.

## 2025-06-13 NOTE — PHYSICAL EXAM
[Clean] : clean [Dry] : dry [Healing Well] : healing well [Palpable Crepitus] : no palpable crepitus [Erythema] : not erythematous [Warm] : not warm [Tender] : not tender [Indurated] : not indurated [Fluctuant] : fluctuant [FreeTextEntry2] : 3 inches by 5 inches resolving hematoma over the pacemaker, generalized ecchymosis over left anterior chest and left flank, also resolving since last monday

## 2025-06-25 NOTE — DISCUSSION/SUMMARY
[FreeTextEntry1] :  # Chronic systolic heart failure - Etiology: ischemic  - GDMT: current regimen is Entresto 49-51 mg BID, Coreg 12.5 mg BID, Jardiance 10 mg daily and Spironolactone 25 mg QD. Previously on high dose Entresto and Coreg. Today, transition Coreg to Toprol  mg BID in hope to allow BP room to return to high dose ARNI next visit.  - Diuretic: euvolemic off diuretics, encouraged to hydrate, at least 2L daily  - Device: LVEF 38% with junctional rhythm s/p dual chamber ICD 6/3/25 with Dr. FLANNERY - Rhythm: see below - Labs from 6/17 with K 5.2, Cr 1.03 and pro-BNP from April was 863   # Atrial fibrillation/flutter - EKG's/Holters seem to show that his AF is paroxysmal in nature. He is maintained on eliquis for anticoagulation. He is rate controlled and does note some occasions of palpitations when laying down - Underwent AF ablation 2/2025 with Dr. COELHO   # CAD - follows with Dr. Noonan   Return to clinic in 1 month and with Dr. Clark in 6 months

## 2025-06-25 NOTE — PROCEDURE
[No] : not [NSR] : normal sinus rhythm [ICD] : Implantable cardioverter-defibrillator [[employer]] : [facility] [DDDR] : DDDR [Threshold Testing Performed] : Threshold testing was performed [de-identified] : 6/2/2025 [de-identified] :  [de-identified] : see paceart 6/25/25

## 2025-06-25 NOTE — ASSESSMENT
[FreeTextEntry1] : Mr. Barragan is an 83 y.o. M with pmhx CAD (2007 CABG x4), HLD, HTN, chronic HFrEF (EF 30-35%) and persistent AF s/p ablation 1/2020 and 2/2025 who had primary prevention ICD-D placed 6/2/2025. Post-op course c/b large pocket hematoma.   He was seen initially post-procedure for the hematoma on 6/13. CBC drawn 6/17 showing H/H 9.6/31. He presented again on 6/18 and hematoma had already decreased in size. His BP at that visit was also found to be low likely 2/2 poor nutrition combined with high dose HF meds. His Entresto and coreg doses were cut in half at that visit. His hematoma remains soft and is resolving. The ecchymosis is almost gone. He feels better. His BP is also improved on his new medicine regimen. I have asked him to resume Eliquis today. RTO in 1 month. I have asked him to call if the hematoma increases in size, there is incisional bleeding/exudate or if new bruising develops. All questions answered.

## 2025-06-25 NOTE — HISTORY OF PRESENT ILLNESS
[None] : The patient complains of no symptoms [de-identified] : Mr. Barragan is an 83 y.o. M with pmhx CAD (2007 CABG x4), HLD, HTN, chronic HFrEF (EF 30-35%) and persistent AF s/p ablation 1/2020 and 2/2025 who had primary prevention ICD-D placed 6/2/2025. Post-op course c/b large pocket hematoma.   He was seen initially post-procedure for the hematoma on 6/13. CBC drawn 6/17 showing H/H 9.6/31. He presented again on 6/18 and hematoma had already decreased in size. His BP at that visit was also found to be low likely 2/2 poor nutrition combined with high dose HF meds. His Entresto and coreg doses were cut in half at that visit. He presents again today for follow-up. He feels the hematoma is shrinking. He denies pocket tenderness. No bleeding, redness or swelling. He has remained OFF Eliquis since the procedure.

## 2025-06-25 NOTE — HISTORY OF PRESENT ILLNESS
[FreeTextEntry1] : Referring: Dr. Cazares  Mr. Barragan is a 82 YO M with a history of ACC/AHA Stage C ICM (LV 5.8 cm, LVEF 35%) s/p dual chamber ICD (6/2/25), CAD s/p 4v CABG 2007, paroxysmal AF/Aflu s/p remote DCCV and ablation x2 (2020 at [facility] then 2/2025 at [facility]), and HTN who presents for follow-up of his cardiomyopathy.   He has had a longstanding ischemic cardiomyopathy and has been managed with excellent doses of GDMT. He has not had HF exacerbations requiring hospitalizations or congestion requiring diuretics. He has had a good health trajectory overall. He has a history of AF/AFl and had a prior DCCV and ablation at [facility] in 2020.  ===  Last seen in October 2023, as initial consultation. In the interim, established care with Dr. FLANNERY and underwent successful AF ablation 2/2025. Earlier this month, given persistently reduced LVEF of 38% despite max tolerated GDMT and episodes of junctional rhythm, he underwent dual chamber ICD implant. Last week, was seen in device clinic, noted to be hypotensive, BP 75/49 but asymptomatic. Recalls not hydrating much that week. Both high dose Entresto and Coreg were subsequently reduced to moderate dose.   BP today in office 100/60. He reports improved energy and exertional tolerance in recent months. He can walk several blocks on flat ground and is less dyspneic climbing to his 4th floor apartment, having to rest briefly after the third flight. He is currently in PT, for his spine. Notes dependent LLE swelling (site of prior harvest) that resolves with elevation. No LH/dizziness, CP or palpitations. No orthopnea, PND and ABD distention. ICD has not fired and swelling is gradually improving.

## 2025-06-25 NOTE — CARDIOLOGY SUMMARY
[de-identified] : EKG 10/2023: AF, inferior infarct pattern  EKG 8/2023: AF, IVCD  [de-identified] : TTE 4/2025: LVIDd 5.3 cm, LVEF38% (regional), normal RV size and function, normal biatrial size, mild AI, mild MR, mild TR, est PASP 25 mmHg, IVC dilated with normal inspiratory collapse   TTE 8/2023: LV 5.8 cm, LVEF visually 35%, regional WMAs, normal RV size/function, mild-moderate MR, mild-moderate AI

## 2025-06-25 NOTE — PHYSICAL EXAM
[Soft] : abdomen soft [Non Tender] : non-tender [Normal Conjunctiva] : normal conjunctiva [Normal Venous Pressure] : normal venous pressure [Normal] : alert and oriented, normal memory [de-identified] : No perioral cyanosis, MMM  [de-identified] : JVP 6-8 cm H2O, no HJR [de-identified] : Irregularly irregular, no murmurs or gallops  [de-identified] : Slow steady gait [de-identified] : LLE 2+ pretibial edema, trace RLE swelling [de-identified] : ICD site with ecchymosis and swelling. Incision site well approximated

## 2025-06-25 NOTE — ASSESSMENT
[FreeTextEntry1] :  84 YO M with a history of ACC/AHA Stage C ICM (LV 5.8 cm, LVEF 35%), CAD s/p 4v CABG 2007, paroxysmal AF/AFl on eliquis and s/p remote DCCV and ablation 2020 at [facility], and HTN who is presenting to HF clinic for further management. He recently underwent repeat AF ablation in February and more recently, DC-ICD for juctional rhythm and persistently reduced LVEF of 38%  Today he reports NYHA II symptoms and appears euvolemic on exam. He is on reduced GDMT following episode of asymptomatic hypotension last week likely in the setting of hypovolemia.

## 2025-06-25 NOTE — PHYSICAL EXAM
[Clean] : clean [Dry] : dry [Healing Well] : healing well [Fluctuant] : fluctuant [General Appearance - Well Developed] : well developed [Normal Appearance] : normal appearance [Well Groomed] : well groomed [General Appearance - Well Nourished] : well nourished [No Deformities] : no deformities [General Appearance - In No Acute Distress] : no acute distress [Palpable Crepitus] : no palpable crepitus [Erythema] : not erythematous [Warm] : not warm [Tender] : not tender [Indurated] : not indurated [FreeTextEntry2] : 3 inches by 5 inches resolving hematoma over the pacemaker, ecchymosis almost fully resolved.

## 2025-06-26 NOTE — HISTORY OF PRESENT ILLNESS
[None] : The patient complains of no symptoms [de-identified] : Mr. Barragan is an 83 y.o. M with pmhx CAD (2007 CABG x4), HLD, HTN, chronic HFrEF (EF 30-35%) and persistent AF s/p ablation 1/2020 and 2/2025 who had primary prevention ICD-D placed 6/2/2025. Post-op course c/b large pocket hematoma.   He was seen initially post-procedure for the hematoma on 6/13. CBC drawn 6/17 showing H/H 9.6/31. He presented today for repeat wound check.    His BP today is low - 75/49. He reports compliance with his HF meds. He is OFF Eliquis temporarily in the setting of the hematoma. He believes the hematoma is shrinking. He denies pain,

## 2025-06-26 NOTE — PROCEDURE
[No] : not [NSR] : normal sinus rhythm [ICD] : Implantable cardioverter-defibrillator [[employer]] : [facility] [DDDR] : DDDR [Threshold Testing Performed] : Threshold testing was performed [de-identified] : 6/2/2025 [de-identified] :  [de-identified] : see paceart 6/18/25

## 2025-06-26 NOTE — PHYSICAL EXAM
[General Appearance - Well Developed] : well developed [Normal Appearance] : normal appearance [Well Groomed] : well groomed [General Appearance - Well Nourished] : well nourished [No Deformities] : no deformities [General Appearance - In No Acute Distress] : no acute distress [Clean] : clean [Dry] : dry [Healing Well] : healing well [Palpable Crepitus] : no palpable crepitus [Erythema] : not erythematous [Warm] : not warm [Tender] : not tender [Indurated] : not indurated [Fluctuant] : fluctuant [FreeTextEntry2] : 3 inches by 5 inches resolving hematoma over the pacemaker, mild ecchymosis.

## 2025-06-26 NOTE — ASSESSMENT
[FreeTextEntry1] : Mr. Barragan is an 83 y.o. M with pmhx CAD (2007 CABG x4), HLD, HTN, chronic HFrEF (EF 30-35%) and persistent AF s/p ablation 1/2020 and 2/2025 who had primary prevention ICD-D placed 6/2/2025. Post-op course c/b large pocket hematoma.   He was seen initially post-procedure for the hematoma on 6/13. CBC drawn 6/17 showing H/H 9.6/31. He presents again today. Hematoma appears to have decreased in size and remains soft. Ecchymosis resolving. His BP is low, likely 2/2 poor nutrition combined with high dose HF meds. I spoke to Dr. Cazares who recommends his Entresto and coreg doses be cut in half. I have asked him to continue holding Eliquis. RTO in one week for repeat wound check. I have asked him to call if the hematoma increases in size, there is incisional bleeding/exudate or if new bruising develops. All questions answered.

## 2025-07-30 NOTE — PHYSICAL EXAM
[Normal Conjunctiva] : normal conjunctiva [Normal Venous Pressure] : normal venous pressure [Soft] : abdomen soft [Non Tender] : non-tender [Normal] : alert and oriented, normal memory [Normal S1, S2] : normal S1, S2 [No Murmur] : no murmur [No Rub] : no rub [No Gallop] : no gallop [de-identified] : No perioral cyanosis, MMM  [de-identified] : JVP < 6 cm of H2O [de-identified] : Slow steady gait [de-identified] : LLE 1+ ankle edema, trace RLE pedal edema [de-identified] : ICD site well healed

## 2025-07-30 NOTE — ASSESSMENT
[FreeTextEntry1] : Mr. Barragan is an 83 y.o. M with pmhx CAD (2007 CABG x4), HLD, HTN, chronic HFrEF (EF 30-35%) and persistent AF s/p ablation 1/2020 and 2/2025 who had primary prevention ICD-D placed 6/2/2025. Post-op course c/b large pocket hematoma.   His hematoma on today's check has resolved. He denies any pain to the incision or pocket. His device check reveals normal function. No events. Outputs reduced. I have asked him to continue with remote monitoring. He will RTO in 6 months for routine check.

## 2025-07-30 NOTE — PHYSICAL EXAM
[General Appearance - Well Developed] : well developed [Normal Appearance] : normal appearance [Well Groomed] : well groomed [General Appearance - Well Nourished] : well nourished [No Deformities] : no deformities [General Appearance - In No Acute Distress] : no acute distress [Clean] : clean [Dry] : dry [Well-Healed] : well-healed [Palpable Crepitus] : no palpable crepitus [Erythema] : not erythematous [Warm] : not warm [Tender] : not tender [Indurated] : not indurated [Fluctuant] : not fluctuant [FreeTextEntry2] : .

## 2025-07-30 NOTE — PROCEDURE
[No] : not [NSR] : normal sinus rhythm [ICD] : Implantable cardioverter-defibrillator [[employer]] : [facility] [DDDR] : DDDR [Threshold Testing Performed] : Threshold testing was performed [de-identified] : 6/2/2025 [de-identified] :  [de-identified] : see paceart 7/30/25

## 2025-07-30 NOTE — HISTORY OF PRESENT ILLNESS
[FreeTextEntry1] : Referring: Dr. Cazares  Mr. Barragan is a 85 YO M with a history of ACC/AHA Stage C ICM (LV 5.8 cm, LVEF 35%) s/p dual chamber ICD (6/2/25), CAD s/p 4v CABG 2007, paroxysmal AF/Aflu s/p remote DCCV and ablation x2 (2020 at [facility] then 2/2025 at [facility]), and HTN who presents for follow-up of his cardiomyopathy.   He has had a longstanding ischemic cardiomyopathy and has been managed with excellent doses of GDMT. He has not had HF exacerbations requiring hospitalizations or congestion requiring diuretics. He has had a good health trajectory overall. He has a history of AF/AFl and had a prior DCCV and ablation at [facility] in 2020.  ===  Established care with Dr. FLANNERY and underwent successful AF ablation 2/2025. Earlier this month, given persistently reduced LVEF of 38% despite max tolerated GDMT and episodes of junctional rhythm, he underwent dual chamber ICD implant. He was urgently seen by the HF clinic for hypotension BP 75/49 but asymptomatic. Recalls not hydrating much that week. Both high dose Entresto and Coreg were subsequently reduced to moderate dose.  Seen by [facility] and Carvedilol was transitioned to metoprolol and has been attempting to hydrate more frequently. He has a notable weight loss (clinic weight is down 9 pounds) but has a good appetite. BP in clinic today with SBP ~100. He does not take BP at home but feels well.   Functionally, he lives in the [facility] and is completely independent.  He reports improved energy and exertional tolerance in recent months. He can walk several blocks on flat ground and is less dyspneic climbing to his 4th floor apartment, having to rest briefly after the third flight. He is currently in PT, for his spine. Notes dependent LLE swelling (site of prior harvest) that resolves with elevation. No LH/dizziness, CP or palpitations. No orthopnea, PND and ABD distention. ICD has not fired and swelling is gradually improving.

## 2025-07-30 NOTE — ASSESSMENT
[FreeTextEntry1] : 85 YO M with a history of ACC/AHA Stage C ICM (LV 5.8 cm, LVEF 35%), CAD s/p 4v CABG 2007, paroxysmal AF/AFl on eliquis and s/p remote DCCV and ablation 2020 at [facility], and HTN who is presenting to HF clinic for further management. He recently underwent repeat AF ablation in February and more recently, DC-ICD for juctional rhythm and persistently reduced LVEF of 38%  Today he reports NYHA II symptoms and appears euvolemic on exam. He is on reduced GDMT following episode of asymptomatic hypotension but tolerating escalation with room for further titration as he was previously tolerating max doses of ARNI.   # Chronic systolic heart failure - Etiology: ischemic  - GDMT: current regimen is Entresto 49-51 mg BID, Toprol  mg BID, Jardiance 10 mg daily and Spironolactone 25 mg QD. Previously on high dose Entresto and today will resume Entresto 97/103 mg BID, instructed to HOLD spironolactone 25 mg tablets w/ K of 5.2 will repeat labs on Monday if K < 5.0 will resume spironolactone 25 mg three days weekly.  - Diuretic: euvolemic off diuretics, encouraged to hydrate, at least 2L daily  - Device: LVEF 38% with junctional rhythm s/p dual chamber ICD 6/3/25 with Dr. FLANNERY - Rhythm: see below - Labs from 6/17 with K 5.2, Cr 1.03 and pro-BNP from April was 863. Repeat labs on Monday at [facility].     # Atrial fibrillation/flutter - EKG's/Holters seem to show that his AF is paroxysmal in nature. He is maintained on eliquis for anticoagulation. He is rate controlled and does note some occasions of palpitations when laying down - Underwent AF ablation 2/2025 with Dr. COELHO   # CAD - follows with Dr. Noonan   #Elevated K/ Hyperkalemia  - Historically K has been as high as 5.6  - most recent K 5.2 - will increase ARNI and hold MRA at present, w/ reepat labs on Monday - If K is less than < 5.0 will add back Spironolactone 25 mg three days weekly   Return to clinic in 1 month with cardiology or HF (for BP check) and with Dr. Clark in in 12/2025.

## 2025-07-30 NOTE — PHYSICAL EXAM
[Normal Conjunctiva] : normal conjunctiva [Normal Venous Pressure] : normal venous pressure [Soft] : abdomen soft [Non Tender] : non-tender [Normal] : alert and oriented, normal memory [Normal S1, S2] : normal S1, S2 [No Murmur] : no murmur [No Rub] : no rub [No Gallop] : no gallop [de-identified] : No perioral cyanosis, MMM  [de-identified] : JVP < 6 cm of H2O [de-identified] : Slow steady gait [de-identified] : LLE 1+ ankle edema, trace RLE pedal edema [de-identified] : ICD site well healed

## 2025-07-30 NOTE — CARDIOLOGY SUMMARY
[de-identified] : EKG 10/2023: AF, inferior infarct pattern  EKG 8/2023: AF, IVCD  [de-identified] : TTE 4/2025: LVIDd 5.3 cm, LVEF38% (regional), normal RV size and function, normal biatrial size, mild AI, mild MR, mild TR, est PASP 25 mmHg, IVC dilated with normal inspiratory collapse   TTE 8/2023: LV 5.8 cm, LVEF visually 35%, regional WMAs, normal RV size/function, mild-moderate MR, mild-moderate AI

## 2025-07-30 NOTE — ASSESSMENT
[FreeTextEntry1] : 85 YO M with a history of ACC/AHA Stage C ICM (LV 5.8 cm, LVEF 35%), CAD s/p 4v CABG 2007, paroxysmal AF/AFl on eliquis and s/p remote DCCV and ablation 2020 at [facility], and HTN who is presenting to HF clinic for further management. He recently underwent repeat AF ablation in February and more recently, DC-ICD for juctional rhythm and persistently reduced LVEF of 38%  Today he reports NYHA II symptoms and appears euvolemic on exam. He is on reduced GDMT following episode of asymptomatic hypotension but tolerating escalation with room for further titration as he was previously tolerating max doses of ARNI.   # Chronic systolic heart failure - Etiology: ischemic  - GDMT: current regimen is Entresto 49-51 mg BID, Toprol  mg BID, Jardiance 10 mg daily and Spironolactone 25 mg QD. Previously on high dose Entresto and today will resume Entresto 97/103 mg BID, instructed to HOLD spironolactone 25 mg tablets w/ K of 5.2 will repeat labs on Monday if K < 5.0 will resume spironolactone 25 mg three days weekly.  - Diuretic: euvolemic off diuretics, encouraged to hydrate, at least 2L daily  - Device: LVEF 38% with junctional rhythm s/p dual chamber ICD 6/3/25 with Dr. FLANNERY - Rhythm: see below - Labs from 6/17 with K 5.2, Cr 1.03 and pro-BNP from April was 863. Repeat labs on Monday at [facility].     # Atrial fibrillation/flutter - EKG's/Holters seem to show that his AF is paroxysmal in nature. He is maintained on eliquis for anticoagulation. He is rate controlled and does note some occasions of palpitations when laying down - Underwent AF ablation 2/2025 with Dr. COELHO   # CAD - follows with Dr. Noonan   #Elevated K/ Hyperkalemia  - Historically K has been as high as 5.6  - most recent K 5.2 - will increase ARNI and hold MRA at present, w/ reepat labs on Monday - If K is less than < 5.0 will add back Spironolactone 25 mg three days weekly   Return to clinic in 1 month with cardiology or HF (for BP check) and with Dr. Clark in in 12/2025.

## 2025-07-30 NOTE — CARDIOLOGY SUMMARY
[de-identified] : EKG 10/2023: AF, inferior infarct pattern  EKG 8/2023: AF, IVCD  [de-identified] : TTE 4/2025: LVIDd 5.3 cm, LVEF38% (regional), normal RV size and function, normal biatrial size, mild AI, mild MR, mild TR, est PASP 25 mmHg, IVC dilated with normal inspiratory collapse   TTE 8/2023: LV 5.8 cm, LVEF visually 35%, regional WMAs, normal RV size/function, mild-moderate MR, mild-moderate AI

## 2025-07-30 NOTE — HISTORY OF PRESENT ILLNESS
[FreeTextEntry1] : Referring: Dr. Cazares  Mr. Barragan is a 83 YO M with a history of ACC/AHA Stage C ICM (LV 5.8 cm, LVEF 35%) s/p dual chamber ICD (6/2/25), CAD s/p 4v CABG 2007, paroxysmal AF/Aflu s/p remote DCCV and ablation x2 (2020 at [facility] then 2/2025 at [facility]), and HTN who presents for follow-up of his cardiomyopathy.   He has had a longstanding ischemic cardiomyopathy and has been managed with excellent doses of GDMT. He has not had HF exacerbations requiring hospitalizations or congestion requiring diuretics. He has had a good health trajectory overall. He has a history of AF/AFl and had a prior DCCV and ablation at [facility] in 2020.  ===  Established care with Dr. FLANNERY and underwent successful AF ablation 2/2025. Earlier this month, given persistently reduced LVEF of 38% despite max tolerated GDMT and episodes of junctional rhythm, he underwent dual chamber ICD implant. He was urgently seen by the HF clinic for hypotension BP 75/49 but asymptomatic. Recalls not hydrating much that week. Both high dose Entresto and Coreg were subsequently reduced to moderate dose.  Seen by [facility] and Carvedilol was transitioned to metoprolol and has been attempting to hydrate more frequently. He has a notable weight loss (clinic weight is down 9 pounds) but has a good appetite. BP in clinic today with SBP ~100. He does not take BP at home but feels well.   Functionally, he lives in the [facility] and is completely independent.  He reports improved energy and exertional tolerance in recent months. He can walk several blocks on flat ground and is less dyspneic climbing to his 4th floor apartment, having to rest briefly after the third flight. He is currently in PT, for his spine. Notes dependent LLE swelling (site of prior harvest) that resolves with elevation. No LH/dizziness, CP or palpitations. No orthopnea, PND and ABD distention. ICD has not fired and swelling is gradually improving.

## 2025-07-30 NOTE — HISTORY OF PRESENT ILLNESS
[None] : The patient complains of no symptoms [de-identified] : Mr. Barragan is an 83 y.o. M with pmhx CAD (2007 CABG x4), HLD, HTN, chronic HFrEF (EF 30-35%) and persistent AF s/p ablation 1/2020 and 2/2025 who had primary prevention ICD-D placed 6/2/2025. Post-op course c/b large pocket hematoma. He presents for his one month wound/device check.   He was seen initially post-procedure for the hematoma on 6/13. CBC drawn 6/17 showing H/H 9.6/31. He presented again on 6/18 and hematoma had already decreased in size. His BP at that visit was also found to be low likely 2/2 poor nutrition combined with high dose HF meds. His Entresto and coreg doses were cut in half at that visit. Presented again on 6/25 and Eliquis was resumed. On today's check, hematoma has resolved and incison is fully healed. He offers no complaints.